# Patient Record
Sex: MALE | Race: WHITE | Employment: FULL TIME | URBAN - METROPOLITAN AREA
[De-identification: names, ages, dates, MRNs, and addresses within clinical notes are randomized per-mention and may not be internally consistent; named-entity substitution may affect disease eponyms.]

---

## 2023-02-24 LAB — PROSTATE SPECIFIC AG (NG/ML) IN SER/PLAS: 7.02 NG/ML (ref 0–4)

## 2024-10-25 ENCOUNTER — OFFICE VISIT (OUTPATIENT)
Dept: URGENT CARE | Age: 54
End: 2024-10-25
Payer: COMMERCIAL

## 2024-10-25 ENCOUNTER — HOSPITAL ENCOUNTER (OUTPATIENT)
Dept: RADIOLOGY | Facility: CLINIC | Age: 54
Discharge: HOME | End: 2024-10-25
Payer: COMMERCIAL

## 2024-10-25 VITALS
OXYGEN SATURATION: 98 % | TEMPERATURE: 98.6 F | HEART RATE: 77 BPM | WEIGHT: 165 LBS | SYSTOLIC BLOOD PRESSURE: 165 MMHG | BODY MASS INDEX: 25.09 KG/M2 | DIASTOLIC BLOOD PRESSURE: 89 MMHG | RESPIRATION RATE: 14 BRPM

## 2024-10-25 DIAGNOSIS — M79.671 PAIN OF RIGHT HEEL: ICD-10-CM

## 2024-10-25 DIAGNOSIS — M72.2 PLANTAR FASCIITIS OF RIGHT FOOT: Primary | ICD-10-CM

## 2024-10-25 PROCEDURE — 73650 X-RAY EXAM OF HEEL: CPT | Mod: RT

## 2024-10-25 RX ORDER — PREDNISONE 20 MG/1
20 TABLET ORAL 2 TIMES DAILY
Qty: 10 TABLET | Refills: 0 | Status: SHIPPED | OUTPATIENT
Start: 2024-10-25 | End: 2024-10-30

## 2024-10-25 ASSESSMENT — PAIN SCALES - GENERAL: PAINLEVEL_OUTOF10: 10-WORST PAIN EVER

## 2024-10-25 NOTE — PROGRESS NOTES
Subjective   Patient ID: Yuniel Núñez is a 54 y.o. male. They present today with a chief complaint of Heel Pain (Right heel pain X 1 day. Pt denies any injury. ).    History of Present Illness  HPI    Patient presents to urgent care for complaints of right heel pain for 1 day.  Denies any injury to his heel.  Past Medical History  Allergies as of 10/25/2024    (No Known Allergies)       (Not in a hospital admission)       Past Medical History:   Diagnosis Date    Acute serous otitis media, unspecified ear     Otitis media, serous, acute    Acute serous otitis media, unspecified ear 05/13/2019    Acute serous otitis media    Acute upper respiratory infection, unspecified 05/13/2019    Acute upper respiratory infection    Chondrocostal junction syndrome (tietze) 05/03/2016    Costochondritis    Chronic maxillary sinusitis     Sinusitis, maxillary, chronic    Chronic rhinitis     Chronic rhinitis    Contact with and (suspected) exposure to covid-19 12/18/2020    Suspected COVID-19 virus infection    Cough, unspecified 12/18/2020    Cough in adult    Myositis, unspecified 05/25/2017    Myositis of right shoulder    Other microscopic hematuria     Microscopic hematuria    Personal history of diseases of the skin and subcutaneous tissue     History of atopic dermatitis    Personal history of other diseases of the respiratory system 05/13/2019    History of sinusitis    Personal history of other diseases of the respiratory system 04/10/2015    History of acute pharyngitis    Personal history of other infectious and parasitic diseases 12/18/2020    History of viral infection    Personal history of other malignant neoplasm of skin     History of basal cell carcinoma (BCC)    Personal history of other specified conditions     History of precordial chest pain    Personal history of other specified conditions 04/28/2020    History of persistent cough    Personal history of other specified conditions 02/12/2015    History of  dizziness    Strain of muscle, fascia and tendon at neck level, initial encounter 05/25/2017    Cervical strain       Past Surgical History:   Procedure Laterality Date    KNEE SURGERY  11/29/2014    Knee Surgery            Review of Systems  Review of Systems                               Objective    Vitals:    10/25/24 1912   BP: 165/89   Pulse: 77   Resp: 14   Temp: 37 °C (98.6 °F)   SpO2: 98%   Weight: 74.8 kg (165 lb)     No LMP for male patient.    Physical Exam  HENT:      Head: Normocephalic.   Musculoskeletal:      Right foot: Decreased range of motion. Tenderness (+ tenderness to right plantar fascia) present. No swelling.      Left foot: Normal.   Neurological:      Mental Status: He is alert.         Procedures    Point of Care Test & Imaging Results from this visit  No results found for this visit on 10/25/24.   No results found.    Diagnostic study results (if any) were reviewed by ALVAOR Lara.    Assessment/Plan   Allergies, medications, history, and pertinent labs/EKGs/Imaging reviewed by ALVARO Lara.     Medical Decision Making  Preliminary images reviewed, possible heel spur noted. Symptoms consisted with plantar fascitis. Will start patient on prednisone to help with pain and inflammation. Exercises given to patient to help with pain. If pain is not improving or worsening in the next 1-2 weeks then follow up with podiatry.     Orders and Diagnoses  Diagnoses and all orders for this visit:  Pain of right heel  -     XR calcaneus right 2 views; Future      Medical Admin Record      Patient disposition: Home    Electronically signed by ALVARO Lara  7:35 PM

## 2025-03-10 ENCOUNTER — OFFICE VISIT (OUTPATIENT)
Dept: URGENT CARE | Age: 55
End: 2025-03-10
Payer: COMMERCIAL

## 2025-03-10 VITALS
RESPIRATION RATE: 16 BRPM | TEMPERATURE: 98.1 F | OXYGEN SATURATION: 98 % | SYSTOLIC BLOOD PRESSURE: 151 MMHG | DIASTOLIC BLOOD PRESSURE: 85 MMHG | HEART RATE: 76 BPM

## 2025-03-10 DIAGNOSIS — R42 VERTIGO: Primary | ICD-10-CM

## 2025-03-10 PROCEDURE — 99213 OFFICE O/P EST LOW 20 MIN: CPT | Performed by: STUDENT IN AN ORGANIZED HEALTH CARE EDUCATION/TRAINING PROGRAM

## 2025-03-10 PROCEDURE — 1036F TOBACCO NON-USER: CPT | Performed by: STUDENT IN AN ORGANIZED HEALTH CARE EDUCATION/TRAINING PROGRAM

## 2025-03-10 RX ORDER — RIMEGEPANT SULFATE 75 MG/75MG
TABLET, ORALLY DISINTEGRATING ORAL
COMMUNITY

## 2025-03-10 RX ORDER — MECLIZINE HYDROCHLORIDE 25 MG/1
25 TABLET ORAL 3 TIMES DAILY PRN
Qty: 45 TABLET | Refills: 0 | Status: SHIPPED | OUTPATIENT
Start: 2025-03-10 | End: 2025-03-25

## 2025-03-10 RX ORDER — PREDNISONE 20 MG/1
TABLET ORAL
COMMUNITY

## 2025-03-10 RX ORDER — TOPIRAMATE 25 MG/1
TABLET ORAL
COMMUNITY
Start: 2024-04-19

## 2025-03-10 ASSESSMENT — ENCOUNTER SYMPTOMS
NUMBNESS: 0
DIZZINESS: 1
TREMORS: 0
WEAKNESS: 0
SPEECH DIFFICULTY: 0
HEADACHES: 1
SEIZURES: 0

## 2025-03-10 ASSESSMENT — PAIN SCALES - GENERAL: PAINLEVEL_OUTOF10: 0-NO PAIN

## 2025-03-10 NOTE — PROGRESS NOTES
Subjective   Patient ID: Yuniel Núñez is a 54 y.o. male. They present today with a chief complaint of Migraine (Pt c/o migraines with vertigo X 1 wk intermittent. ).    History of Present Illness    Migraine  Associated symptoms: headaches      Patient presents to the urgent care for a chief complaint of vertigo over the last week, patient complains of intermittent dizziness began while waking up in the middle the night to use the restroom patient states does occur with rapid head movement no report of vomiting or diarrhea no chest pain or shortness of breath patient does have history of migraines  Past Medical History  Allergies as of 03/10/2025 - Reviewed 03/10/2025   Allergen Reaction Noted    Iodinated contrast media Nausea And Vomiting, Nausea Only, and Nausea/vomiting 07/03/2012       (Not in a hospital admission)       Past Medical History:   Diagnosis Date    Acute serous otitis media, unspecified ear     Otitis media, serous, acute    Acute serous otitis media, unspecified ear 05/13/2019    Acute serous otitis media    Acute upper respiratory infection, unspecified 05/13/2019    Acute upper respiratory infection    Chondrocostal junction syndrome (tietze) 05/03/2016    Costochondritis    Chronic maxillary sinusitis     Sinusitis, maxillary, chronic    Chronic rhinitis     Chronic rhinitis    Contact with and (suspected) exposure to covid-19 12/18/2020    Suspected COVID-19 virus infection    Cough, unspecified 12/18/2020    Cough in adult    Myositis, unspecified 05/25/2017    Myositis of right shoulder    Other microscopic hematuria     Microscopic hematuria    Personal history of diseases of the skin and subcutaneous tissue     History of atopic dermatitis    Personal history of other diseases of the respiratory system 05/13/2019    History of sinusitis    Personal history of other diseases of the respiratory system 04/10/2015    History of acute pharyngitis    Personal history of other infectious and  parasitic diseases 12/18/2020    History of viral infection    Personal history of other malignant neoplasm of skin     History of basal cell carcinoma (BCC)    Personal history of other specified conditions     History of precordial chest pain    Personal history of other specified conditions 04/28/2020    History of persistent cough    Personal history of other specified conditions 02/12/2015    History of dizziness    Strain of muscle, fascia and tendon at neck level, initial encounter 05/25/2017    Cervical strain       Past Surgical History:   Procedure Laterality Date    KNEE SURGERY  11/29/2014    Knee Surgery        reports that he has never smoked. He has never used smokeless tobacco. He reports that he does not currently use alcohol. He reports that he does not use drugs.    Review of Systems  Review of Systems   Neurological:  Positive for dizziness and headaches. Negative for tremors, seizures, syncope, speech difficulty, weakness and numbness.   All other systems reviewed and are negative.                                 Objective    Vitals:    03/10/25 1158   BP: 151/85   Pulse: 76   Resp: 16   Temp: 36.7 °C (98.1 °F)   SpO2: 98%     No LMP for male patient.    Physical Exam  Vitals and nursing note reviewed.   Constitutional:       General: He is not in acute distress.     Appearance: Normal appearance. He is not ill-appearing, toxic-appearing or diaphoretic.   HENT:      Head: Normocephalic and atraumatic.      Right Ear: Tympanic membrane normal. There is no impacted cerumen.      Left Ear: Tympanic membrane normal. There is no impacted cerumen.   Eyes:      Extraocular Movements: Extraocular movements intact.      Pupils: Pupils are equal, round, and reactive to light.   Cardiovascular:      Rate and Rhythm: Normal rate.   Pulmonary:      Effort: Pulmonary effort is normal. No respiratory distress.      Breath sounds: Normal breath sounds.   Neurological:      General: No focal deficit present.       Mental Status: He is alert and oriented to person, place, and time.   Psychiatric:         Mood and Affect: Mood normal.         Behavior: Behavior normal.         Procedures    Point of Care Test & Imaging Results from this visit  No results found for this visit on 03/10/25.   No results found.    Diagnostic study results (if any) were reviewed by [facility].    Assessment/Plan   Allergies, medications, history, and pertinent labs/EKGs/Imaging reviewed by Amanuel Cerna PA-C.     Medical Decision Making  I did discuss with patient suspected BPPV, patient replaced on Antivert did discuss following up with neurology, I did discuss with patient slow head movements and avoid rapid head movements.  Patient verbalized understanding and is agreeable to plan discharge emergent care A+O x 4 stable condition no signs of distress    Orders and Diagnoses  Diagnoses and all orders for this visit:  Vertigo  -     meclizine (Antivert) 25 mg tablet; Take 1 tablet (25 mg) by mouth 3 times a day as needed for dizziness for up to 15 days.      Medical Admin Record      Patient disposition: Home     Electronically signed by [facility]  12:07 PM